# Patient Record
Sex: MALE | Race: WHITE
[De-identification: names, ages, dates, MRNs, and addresses within clinical notes are randomized per-mention and may not be internally consistent; named-entity substitution may affect disease eponyms.]

---

## 2019-04-15 ENCOUNTER — HOSPITAL ENCOUNTER (EMERGENCY)
Dept: HOSPITAL 95 - ER | Age: 79
Discharge: HOME | End: 2019-04-15
Payer: MEDICARE

## 2019-04-15 VITALS — BODY MASS INDEX: 21.52 KG/M2 | HEIGHT: 68 IN | WEIGHT: 142 LBS

## 2019-04-15 DIAGNOSIS — Z88.8: ICD-10-CM

## 2019-04-15 DIAGNOSIS — I10: Primary | ICD-10-CM

## 2019-04-15 DIAGNOSIS — Z79.899: ICD-10-CM

## 2019-04-15 LAB
ALBUMIN SERPL BCP-MCNC: 3.9 G/DL
ALBUMIN/GLOB SERPL: 1 {RATIO}
ALT SERPL W P-5'-P-CCNC: 19 U/L
ANION GAP SERPL CALCULATED.4IONS-SCNC: 4 MMOL/L
AST SERPL W P-5'-P-CCNC: 17 U/L
BASOPHILS # BLD AUTO: 0.04 K/MM3
BASOPHILS NFR BLD AUTO: 1 %
BILIRUB SERPL-MCNC: 0.8 MG/DL
BUN SERPL-MCNC: 8 MG/DL
CALCIUM SERPL-MCNC: 9.7 MG/DL
CHLORIDE SERPL-SCNC: 104 MMOL/L
CO2 SERPL-SCNC: 31 MMOL/L
CREAT SERPL-MCNC: 0.83 MG/DL
DEPRECATED RDW RBC AUTO: 44.9 FL
EOSINOPHIL # BLD AUTO: 0.47 K/MM3
EOSINOPHIL NFR BLD AUTO: 8 %
ERYTHROCYTE [DISTWIDTH] IN BLOOD BY AUTOMATED COUNT: 13.2 %
GLOBULIN SER CALC-MCNC: 3.8 G/DL
GLUCOSE SERPL-MCNC: 82 MG/DL
HCT VFR BLD AUTO: 47.8 %
HGB BLD-MCNC: 16.1 G/DL
IMM GRANULOCYTES # BLD AUTO: 0.03 K/MM3
IMM GRANULOCYTES NFR BLD AUTO: 1 %
LYMPHOCYTES # BLD AUTO: 1.46 K/MM3
LYMPHOCYTES NFR BLD AUTO: 23 %
MCHC RBC AUTO-ENTMCNC: 33.7 G/DL
MCV RBC AUTO: 92 FL
MONOCYTES # BLD AUTO: 0.8 K/MM3
MONOCYTES NFR BLD AUTO: 13 %
NEUTROPHILS # BLD AUTO: 3.44 K/MM3
NEUTROPHILS NFR BLD AUTO: 55 %
NRBC # BLD AUTO: 0 K/MM3
NRBC BLD AUTO-RTO: 0 /100 WBC
PLATELET # BLD AUTO: 157 K/MM3
POTASSIUM SERPL-SCNC: 3.9 MMOL/L
PROT SERPL-MCNC: 7.7 G/DL
SODIUM SERPL-SCNC: 139 MMOL/L
TROPONIN I SERPL-MCNC: <0.015 NG/ML

## 2019-04-17 ENCOUNTER — HOSPITAL ENCOUNTER (EMERGENCY)
Dept: HOSPITAL 95 - ER | Age: 79
Discharge: HOME | End: 2019-04-17
Payer: MEDICARE

## 2019-04-17 VITALS — HEIGHT: 67 IN | BODY MASS INDEX: 22.13 KG/M2 | WEIGHT: 141.01 LBS

## 2019-04-17 DIAGNOSIS — I10: Primary | ICD-10-CM

## 2019-04-17 DIAGNOSIS — Z79.899: ICD-10-CM

## 2019-04-17 LAB
ALBUMIN SERPL BCP-MCNC: 4 G/DL (ref 3.4–5)
ALBUMIN/GLOB SERPL: 1.1 {RATIO} (ref 0.8–1.8)
ALT SERPL W P-5'-P-CCNC: 23 U/L (ref 12–78)
ANION GAP SERPL CALCULATED.4IONS-SCNC: 7 MMOL/L (ref 6–16)
AST SERPL W P-5'-P-CCNC: 18 U/L (ref 12–37)
BASOPHILS # BLD AUTO: 0.03 K/MM3 (ref 0–0.23)
BASOPHILS NFR BLD AUTO: 1 % (ref 0–2)
BILIRUB SERPL-MCNC: 0.7 MG/DL (ref 0.1–1)
BUN SERPL-MCNC: 7 MG/DL (ref 8–24)
CALCIUM SERPL-MCNC: 9.3 MG/DL (ref 8.5–10.1)
CHLORIDE SERPL-SCNC: 102 MMOL/L (ref 98–108)
CO2 SERPL-SCNC: 30 MMOL/L (ref 21–32)
CREAT SERPL-MCNC: 0.88 MG/DL (ref 0.6–1.2)
DEPRECATED RDW RBC AUTO: 43.7 FL (ref 35.1–46.3)
EOSINOPHIL # BLD AUTO: 0.47 K/MM3 (ref 0–0.68)
EOSINOPHIL NFR BLD AUTO: 8 % (ref 0–6)
ERYTHROCYTE [DISTWIDTH] IN BLOOD BY AUTOMATED COUNT: 13.2 % (ref 11.7–14.2)
GLOBULIN SER CALC-MCNC: 3.6 G/DL (ref 2.2–4)
GLUCOSE SERPL-MCNC: 89 MG/DL (ref 70–99)
HCT VFR BLD AUTO: 49.4 % (ref 37–53)
HGB BLD-MCNC: 16.7 G/DL (ref 13.5–17.5)
IMM GRANULOCYTES # BLD AUTO: 0.02 K/MM3 (ref 0–0.1)
IMM GRANULOCYTES NFR BLD AUTO: 0 % (ref 0–1)
LYMPHOCYTES # BLD AUTO: 1.35 K/MM3 (ref 0.84–5.2)
LYMPHOCYTES NFR BLD AUTO: 22 % (ref 21–46)
MCHC RBC AUTO-ENTMCNC: 33.8 G/DL (ref 31.5–36.5)
MCV RBC AUTO: 91 FL (ref 80–100)
MONOCYTES # BLD AUTO: 0.71 K/MM3 (ref 0.16–1.47)
MONOCYTES NFR BLD AUTO: 11 % (ref 4–13)
NEUTROPHILS # BLD AUTO: 3.66 K/MM3 (ref 1.96–9.15)
NEUTROPHILS NFR BLD AUTO: 59 % (ref 41–73)
NRBC # BLD AUTO: 0 K/MM3 (ref 0–0.02)
NRBC BLD AUTO-RTO: 0 /100 WBC (ref 0–0.2)
PLATELET # BLD AUTO: 154 K/MM3 (ref 150–400)
POTASSIUM SERPL-SCNC: 3.5 MMOL/L (ref 3.5–5.5)
PROT SERPL-MCNC: 7.6 G/DL (ref 6.4–8.2)
SODIUM SERPL-SCNC: 139 MMOL/L (ref 136–145)
TROPONIN I SERPL-MCNC: <0.015 NG/ML (ref 0–0.04)

## 2021-05-05 NOTE — NUR
0149 PT ARRIVED TO ROOM FROM ER IN STABLE CONDITION. PT REPORTS SOB THAT
INCREASES WITH EXERTION. ON 6L O2 NC AT 90%. GETS VERY ANXIOUS WITH ANY SOB
AND STARTS TO HYPERVENTILATE. REPORTS PAIN WITH MOVEMENT IN R ELBOW, WEARING
ARM SLING AT THIS TIME. NO OTHER APPARENT SIGNS OF DISTRESS. CALL LIGHT IS IN
REACH.

## 2021-05-05 NOTE — NUR
PT IS AAO X 4, ON 6L O2 NC, SOB THAT INCREASES WITH EXERTION. REPORTS PAIN
WITH MOVEMENT IN R ELBOW, HAS EPICONDYLITIS. ARM SLING IN PLACE. ANXIETY WITH
ANY SOB, CALMS WITH COACHING ON BREATHING.

## 2021-05-05 NOTE — NUR
PT LYING IN BED, EYES CLOSED, APPEARS TO BE RESTING. BREATHING IS EVEN,
UNLABORED. NO APPARENT SIGNS OF DISTRESS. CALL LIGHT IS IN REACH. NO OTHER
CHANGES THIS SHIFT.

## 2021-05-05 NOTE — NUR
0400 PT LYING IN BED, EYES CLOSED, APPEARS TO BE RESTING. WAKES EASILY TO
VERBAL STIMULI. NO APPARENT SIGNS OF DISTRESS. CALL LIGHT IS IN REACH.

## 2021-05-05 NOTE — NUR
SPOKE WITH PT'S DAUGHTER JEAN ([phone]), GAVE HER UPDATE ON PATIENT'S
STATUS AND PLAN GOING FORWARD: PER RAMÍREZ MONTGOMERY RN CM PT WILL HAVE HOME O2 EVAL
TOMORROW THEN D/C HOME WITH [facility] TO FOLLOW.

## 2021-05-05 NOTE — NUR
SHIFT SUMMARY:
 
TRANSITIONED TO COMFORT CARE TODAY, WIFE AT BEDSIDE. ANXIOUS, SEVERE KANG.
STARTED ON DILAUDID 1 MG PO, WHICH HE STATED WORKED WELL FOR HIS R ELBOW PAIN
BUT STILL HAS INTERMITTENT AIR HUNGER. DAILY XANAX DOSE GIVEN THIS MORNING
GAVE EXCELLENT RELIEF FROM ANXIETY AND AIR HUNGER. EATING SMALL AMOUNTS
OF MEALS D/T DYSPNEA. USING BSC AS HE GETS TOO SOB TO WALK TO BR. IS LOOKING
FORWARD TO D/C HOME TOMORROW.

## 2021-05-05 NOTE — NUR
SPOKE TO DR. SHULTZ REGARDING COMFORT CARE ORDER. HE STATED OK TO MAKE PT
COMFORT CARE, BUT TO LEAVE HIS MEDICATIONS THE SAME. ORDER PLACED.

## 2021-05-06 NOTE — NUR
SHIFT SUMMARY
S/O HYPOXIA, A/O, VSS, TOLERATING SMALL AMTS OF PO INTAKE, SWALLOWS PILLS
WHOLE, WIFE @ BEDSIDE, STABLE ON 7L O2, DENIES PAIN T/O SHIFT, MODERATE
ANXIETY WHEN WOKE UP BUT SETTLES EASILY W/ ENCOURAGEMENT. NO ACUTE EVENTS THIS
SHIFT. CALL LIGHT IN REACH, WILL CONTINUE TO MONITOR AND REPORT TO ONCOMING
DAY RN.

## 2021-05-06 NOTE — NUR
PATIENT DISCHARGED TO HOME WITH HOSPICE TO FOLLOW, WILL SEE PT AT HOME ON
BEL[place] 5/10/21. HAND WRITTEN RX FOR DILAUDID GIVEN TO PT'S WIFE TO FILL. PT'S
WIFE VERBALIZED UNDERSTANDING OF D/C INSTRUCTIONS. O2 TANK FOR RIDE HOME
DELIVERED PT IN ROOM, HOME O2 BEING DELIVERED AT 1545. PT OFF UNIT VIA W/C
WITH CNA, O2, AND WIFE AT 1600. PHONE CALL MADE TO PT'S DAUGHTER JEAN TO GIVE
HER UPDATE ON D/C AND PLAN FOR HOSPICE.

## 2021-05-10 NOTE — NUR
Comfort Care Visit
 
Pt to the ED to focus on comfort. Family and Pt has elected comfort care. Pt
resting on gurny upon arrival. Pt's spouse Lien and daughter Fawn at bedside.
Pt actively dying and passes away moments after this RNs arrival. Offered
condolences and emotional support to family. Called  [facility] per request
of family. Benjamín offers spiritual support.  home discussed with family
choosing [facility]. Family appears to be grieving appropriately.
 
Palliative Care will remain available.

## 2021-05-10 NOTE — NUR
Patient's family are bedside and grieving appropriately. I provide
emotional/grief support and prayer. I acquire water for the family and usher
them to the consult rm to wait for the transport for [facility] to retrieve the body. After several minutes in the consult rm and some
pastoral encouragement given family decides not to wait for the  home
transport to arrive. I assist family find their way to the parking lot. They
show signs of being comforted.